# Patient Record
Sex: FEMALE | Race: WHITE | Employment: FULL TIME | URBAN - METROPOLITAN AREA
[De-identification: names, ages, dates, MRNs, and addresses within clinical notes are randomized per-mention and may not be internally consistent; named-entity substitution may affect disease eponyms.]

---

## 2017-05-06 ENCOUNTER — HOSPITAL ENCOUNTER (OUTPATIENT)
Dept: MAMMOGRAPHY | Age: 42
Discharge: HOME | End: 2017-05-06
Attending: OBSTETRICS & GYNECOLOGY
Payer: COMMERCIAL

## 2017-05-06 DIAGNOSIS — Z80.3 FAMILY HISTORY OF BREAST CANCER: ICD-10-CM

## 2017-05-06 DIAGNOSIS — Z12.31 ENCOUNTER FOR SCREENING MAMMOGRAM FOR BREAST CANCER: ICD-10-CM

## 2017-05-06 PROCEDURE — 77063 BREAST TOMOSYNTHESIS BI: CPT | Performed by: OBSTETRICS & GYNECOLOGY

## 2017-05-06 PROCEDURE — 77067 SCR MAMMO BI INCL CAD: CPT | Performed by: OBSTETRICS & GYNECOLOGY

## 2017-12-12 ENCOUNTER — OFFICE VISIT (OUTPATIENT)
Dept: FAMILY MEDICINE CLINIC | Facility: CLINIC | Age: 42
End: 2017-12-12

## 2017-12-12 VITALS
HEART RATE: 72 BPM | SYSTOLIC BLOOD PRESSURE: 90 MMHG | DIASTOLIC BLOOD PRESSURE: 60 MMHG | RESPIRATION RATE: 16 BRPM | WEIGHT: 135 LBS | BODY MASS INDEX: 23.05 KG/M2 | OXYGEN SATURATION: 98 % | TEMPERATURE: 98 F | HEIGHT: 64 IN

## 2017-12-12 DIAGNOSIS — J06.9 VIRAL URI: Primary | ICD-10-CM

## 2017-12-12 PROCEDURE — 99213 OFFICE O/P EST LOW 20 MIN: CPT | Performed by: NURSE PRACTITIONER

## 2017-12-12 NOTE — PROGRESS NOTES
CHIEF COMPLAINT:   Patient presents with:  Cold: sinus headache,congestion,post nasal drip and cough sx x 2-3 days. HPI:   America Muller is a 38year old female who presents for upper respiratory symptoms for  2 days.  Patient reports sore throat only CARDIO: RRR without murmur  GI: active BS's x4,no masses, hepatosplenomegaly, or tenderness on direct palpation  EXTREMITIES: no cyanosis, clubbing or edema  LYMPH:  No lymphadenopathy.         ASSESSMENT AND PLAN:   Mercedes Enciso is a 38year old female who · You may use acetaminophen or ibuprofen to control pain and fever, unless another medicine was prescribed. If you have chronic liver or kidney disease, have ever had a stomach ulcer or gastrointestinal bleeding, or are taking blood-thinning medicines, channing

## 2021-02-11 DIAGNOSIS — Z23 NEED FOR VACCINATION: ICD-10-CM

## 2021-03-15 PROBLEM — G89.29 CHRONIC RIGHT SHOULDER PAIN: Status: ACTIVE | Noted: 2021-03-15

## 2021-03-15 PROBLEM — M25.511 CHRONIC RIGHT SHOULDER PAIN: Status: ACTIVE | Noted: 2021-03-15

## 2021-05-26 ENCOUNTER — HOSPITAL ENCOUNTER (OUTPATIENT)
Dept: MAMMOGRAPHY | Age: 46
Discharge: HOME | End: 2021-05-26
Attending: OBSTETRICS & GYNECOLOGY
Payer: COMMERCIAL

## 2021-05-26 DIAGNOSIS — Z12.31 ENCOUNTER FOR SCREENING MAMMOGRAM FOR MALIGNANT NEOPLASM OF BREAST: ICD-10-CM

## 2021-05-26 PROCEDURE — 77067 SCR MAMMO BI INCL CAD: CPT | Performed by: OBSTETRICS & GYNECOLOGY

## 2021-05-26 PROCEDURE — 77063 BREAST TOMOSYNTHESIS BI: CPT | Performed by: OBSTETRICS & GYNECOLOGY

## 2022-08-09 ENCOUNTER — HOSPITAL ENCOUNTER (OUTPATIENT)
Dept: MAMMOGRAPHY | Age: 47
Discharge: HOME | End: 2022-08-09
Attending: OBSTETRICS & GYNECOLOGY
Payer: COMMERCIAL

## 2022-08-09 DIAGNOSIS — Z12.31 ENCOUNTER FOR SCREENING MAMMOGRAM FOR MALIGNANT NEOPLASM OF BREAST: ICD-10-CM

## 2022-08-09 PROCEDURE — 77063 BREAST TOMOSYNTHESIS BI: CPT | Performed by: OBSTETRICS & GYNECOLOGY

## 2022-08-09 PROCEDURE — 77067 SCR MAMMO BI INCL CAD: CPT | Performed by: OBSTETRICS & GYNECOLOGY

## 2023-05-19 ENCOUNTER — OFFICE VISIT (OUTPATIENT)
Dept: FAMILY MEDICINE CLINIC | Facility: CLINIC | Age: 48
End: 2023-05-19
Payer: COMMERCIAL

## 2023-05-19 VITALS
WEIGHT: 140 LBS | TEMPERATURE: 98 F | HEIGHT: 64 IN | SYSTOLIC BLOOD PRESSURE: 121 MMHG | HEART RATE: 68 BPM | OXYGEN SATURATION: 98 % | RESPIRATION RATE: 16 BRPM | BODY MASS INDEX: 23.9 KG/M2 | DIASTOLIC BLOOD PRESSURE: 69 MMHG

## 2023-05-19 DIAGNOSIS — J01.00 ACUTE NON-RECURRENT MAXILLARY SINUSITIS: Primary | ICD-10-CM

## 2023-05-19 PROCEDURE — 3078F DIAST BP <80 MM HG: CPT | Performed by: FAMILY MEDICINE

## 2023-05-19 PROCEDURE — 99202 OFFICE O/P NEW SF 15 MIN: CPT | Performed by: FAMILY MEDICINE

## 2023-05-19 PROCEDURE — 3008F BODY MASS INDEX DOCD: CPT | Performed by: FAMILY MEDICINE

## 2023-05-19 PROCEDURE — 3074F SYST BP LT 130 MM HG: CPT | Performed by: FAMILY MEDICINE

## 2023-05-19 RX ORDER — AMOXICILLIN AND CLAVULANATE POTASSIUM 875; 125 MG/1; MG/1
1 TABLET, FILM COATED ORAL 2 TIMES DAILY
Qty: 20 TABLET | Refills: 0 | Status: SHIPPED | OUTPATIENT
Start: 2023-05-19 | End: 2023-05-29

## 2023-08-31 ENCOUNTER — TELEPHONE (OUTPATIENT)
Dept: ORTHOPEDICS CLINIC | Facility: CLINIC | Age: 48
End: 2023-08-31

## 2023-08-31 ENCOUNTER — HOSPITAL ENCOUNTER (OUTPATIENT)
Dept: GENERAL RADIOLOGY | Age: 48
Discharge: HOME | End: 2023-08-31
Attending: PHYSICIAN ASSISTANT
Payer: COMMERCIAL

## 2023-08-31 ENCOUNTER — OFFICE VISIT (OUTPATIENT)
Dept: ORTHOPEDICS CLINIC | Facility: CLINIC | Age: 48
End: 2023-08-31
Payer: COMMERCIAL

## 2023-08-31 VITALS — BODY MASS INDEX: 24.75 KG/M2 | WEIGHT: 145 LBS | HEIGHT: 64 IN

## 2023-08-31 DIAGNOSIS — S63.632A SPRAIN OF INTERPHALANGEAL JOINT OF RIGHT MIDDLE FINGER, INITIAL ENCOUNTER: Primary | ICD-10-CM

## 2023-08-31 DIAGNOSIS — M79.644 PAIN OF RIGHT MIDDLE FINGER: ICD-10-CM

## 2023-08-31 DIAGNOSIS — M79.644 PAIN OF RIGHT MIDDLE FINGER: Primary | ICD-10-CM

## 2023-08-31 PROCEDURE — 73140 X-RAY EXAM OF FINGER(S): CPT | Performed by: PHYSICIAN ASSISTANT

## 2023-09-23 LAB
AMB EXT CHOL/HDL RATIO: 2.6
AMB EXT CHOLESTEROL, TOTAL: 174 MG/DL
AMB EXT GLUCOSE: 94 MG/DL
AMB EXT HDL CHOLESTEROL: 67 MG/DL
AMB EXT HEMATOCRIT: 40
AMB EXT HEMOGLOBIN: 13.4
AMB EXT HGBA1C: 5.3 %
AMB EXT LDL CHOLESTEROL, DIRECT: 99 MG/DL
AMB EXT MCV: 94
AMB EXT PLATELETS: 276
AMB EXT TOTAL VITAMIN D: 35.5
AMB EXT TRIGLYCERIDES: 37 MG/DL
AMB EXT TSH: 0.72 MIU/ML
AMB EXT WBC: 7.6 X10(3)UL

## 2023-12-08 ENCOUNTER — OFFICE VISIT (OUTPATIENT)
Dept: FAMILY MEDICINE CLINIC | Facility: CLINIC | Age: 48
End: 2023-12-08
Payer: COMMERCIAL

## 2023-12-08 VITALS
BODY MASS INDEX: 24.75 KG/M2 | RESPIRATION RATE: 16 BRPM | HEART RATE: 60 BPM | SYSTOLIC BLOOD PRESSURE: 119 MMHG | TEMPERATURE: 98 F | DIASTOLIC BLOOD PRESSURE: 66 MMHG | HEIGHT: 64 IN | WEIGHT: 145 LBS | OXYGEN SATURATION: 96 %

## 2023-12-08 DIAGNOSIS — J06.9 VIRAL UPPER RESPIRATORY TRACT INFECTION: Primary | ICD-10-CM

## 2023-12-08 PROCEDURE — 3078F DIAST BP <80 MM HG: CPT | Performed by: FAMILY MEDICINE

## 2023-12-08 PROCEDURE — 3074F SYST BP LT 130 MM HG: CPT | Performed by: FAMILY MEDICINE

## 2023-12-08 PROCEDURE — 99213 OFFICE O/P EST LOW 20 MIN: CPT | Performed by: FAMILY MEDICINE

## 2023-12-08 PROCEDURE — 3008F BODY MASS INDEX DOCD: CPT | Performed by: FAMILY MEDICINE

## 2023-12-08 NOTE — PATIENT INSTRUCTIONS
Use OTC meds for comfort as needed--  Ibuprofen/Tylenol for fever/pain  Use Benadryl at bedtime to reduce drainage and promote rest.  Zyrtec/Claritin/Allegra in the AM to reduce nasal drainage without sedation. Use saline nasal sprays to reduce congestion and thin secretions. Use Delsym for cough. Consider applying coby's vapo-rub or eucayptus oil to chest and feet at bedtime to reduce chest and nasal congestion. Warm tea with honey, cough lozenges, vaporizers/steam etc.    If no better in 2-3 days, follow-up with your PCP for further evaluation.

## 2023-12-21 ENCOUNTER — OFFICE VISIT (OUTPATIENT)
Dept: FAMILY MEDICINE CLINIC | Facility: CLINIC | Age: 48
End: 2023-12-21
Payer: COMMERCIAL

## 2023-12-21 VITALS
WEIGHT: 145 LBS | SYSTOLIC BLOOD PRESSURE: 112 MMHG | HEIGHT: 64 IN | DIASTOLIC BLOOD PRESSURE: 77 MMHG | TEMPERATURE: 98 F | HEART RATE: 66 BPM | OXYGEN SATURATION: 97 % | BODY MASS INDEX: 24.75 KG/M2 | RESPIRATION RATE: 18 BRPM

## 2023-12-21 DIAGNOSIS — J01.40 ACUTE NON-RECURRENT PANSINUSITIS: Primary | ICD-10-CM

## 2023-12-21 PROCEDURE — 3078F DIAST BP <80 MM HG: CPT | Performed by: NURSE PRACTITIONER

## 2023-12-21 PROCEDURE — 3074F SYST BP LT 130 MM HG: CPT | Performed by: NURSE PRACTITIONER

## 2023-12-21 PROCEDURE — 3008F BODY MASS INDEX DOCD: CPT | Performed by: NURSE PRACTITIONER

## 2023-12-21 PROCEDURE — 99213 OFFICE O/P EST LOW 20 MIN: CPT | Performed by: NURSE PRACTITIONER

## 2023-12-21 RX ORDER — AMOXICILLIN AND CLAVULANATE POTASSIUM 875; 125 MG/1; MG/1
1 TABLET, FILM COATED ORAL 2 TIMES DAILY
Qty: 14 TABLET | Refills: 0 | Status: SHIPPED | OUTPATIENT
Start: 2023-12-21 | End: 2023-12-28

## 2024-04-11 ENCOUNTER — OFFICE VISIT (OUTPATIENT)
Dept: FAMILY MEDICINE CLINIC | Facility: CLINIC | Age: 49
End: 2024-04-11
Payer: COMMERCIAL

## 2024-04-11 ENCOUNTER — TELEPHONE (OUTPATIENT)
Dept: FAMILY MEDICINE CLINIC | Facility: CLINIC | Age: 49
End: 2024-04-11

## 2024-04-11 VITALS
BODY MASS INDEX: 25.78 KG/M2 | OXYGEN SATURATION: 98 % | DIASTOLIC BLOOD PRESSURE: 70 MMHG | HEART RATE: 68 BPM | TEMPERATURE: 98 F | HEIGHT: 64 IN | RESPIRATION RATE: 16 BRPM | SYSTOLIC BLOOD PRESSURE: 100 MMHG | WEIGHT: 151 LBS

## 2024-04-11 DIAGNOSIS — R06.00 DYSPNEA, UNSPECIFIED TYPE: ICD-10-CM

## 2024-04-11 DIAGNOSIS — Z00.00 ENCOUNTER FOR MEDICAL EXAMINATION TO ESTABLISH CARE: Primary | ICD-10-CM

## 2024-04-11 DIAGNOSIS — Z12.12 ENCOUNTER FOR COLORECTAL CANCER SCREENING: ICD-10-CM

## 2024-04-11 DIAGNOSIS — Z12.11 ENCOUNTER FOR COLORECTAL CANCER SCREENING: ICD-10-CM

## 2024-04-11 DIAGNOSIS — Z97.5 IUD (INTRAUTERINE DEVICE) IN PLACE: ICD-10-CM

## 2024-04-11 DIAGNOSIS — Z13.31 POSITIVE DEPRESSION SCREENING: ICD-10-CM

## 2024-04-11 DIAGNOSIS — N95.1 PERIMENOPAUSAL: ICD-10-CM

## 2024-04-11 DIAGNOSIS — I34.1 MITRAL VALVE PROLAPSE: ICD-10-CM

## 2024-04-11 DIAGNOSIS — E66.3 OVERWEIGHT (BMI 25.0-29.9): ICD-10-CM

## 2024-04-11 RX ORDER — ALBUTEROL SULFATE 90 UG/1
AEROSOL, METERED RESPIRATORY (INHALATION)
Qty: 3.7 G | Refills: 1 | Status: SHIPPED | OUTPATIENT
Start: 2024-04-11

## 2024-04-11 NOTE — PATIENT INSTRUCTIONS
Weight Loss Tips  Cut down on sugar and starches.  Ok to eat healthy fat ( avocado, nuts,eggs, olive oil and coconut oil)  Eat protein with each meal target 15-30 G of protein with each meal: Protein reduces appetite, cravings and hunger. It increases muscle mass and subsequently metabolism and fat burning.  Limit carbohydrates <100g / day  Limit processed food, eat unprocessed food whenever you can.  Read nutrition labels  Drink >64 oz of water per day: Water is a natural appetite suppressant, increases calorie burning and help you to loose weight.  Do not drink your calories ( avoid soft drinks, juice, high calorie coffee drinks, limit alcohol) Also stay away from artificially sweetened beverages too it can cause weight gain.  Attempt Intermittent Fasting--- Fast for 16 hrs and Feast for 8hrs  Do not eat late at night.   Exercise goal for weight loss is 150 minutes per week. (There is benefit from doing TWO 1hr and 15m workouts meets the 150m goal)  Use smart phone applications to track your progress/intake e.g:(Myfitnesspal )  Reduce your stress level; Bedtime meditation 5-10 minutes daily.   Helpful exercise apps( iFIT)  Helpful fasting apps :( Zero)   Foods to avoid :  Sugar: sweets, ice cream, fruit juices, candy and food that is high in added sugar.  Highly processed food  Refined grains: Rice, wheat, bread, cereal and pasta.  Starchy vegetables: Potatoes and corn      Low Carb food :  Meat: lamb, steak, chicken, turkey  Fish and sea food   Eggs  Plain yogurt   Cheese   Fat and oils   Fruits: berries are the best        How I Plan to Lose Weight  Goal setting is the “how” of weight loss. Motivators are the “why.” When setting goals, utilize the SMART technique:  SMART Technique Example   Specific Who, what, where, when, how… “I want to lose 10 pounds in two months.”   Measureable How will you track? 10 pounds in 8 weeks = 1.25 pounds/week   Attainable Resources you have available, previous experience “I have  been able to do this before, and now I have new tools from my doctor!”   Relevant Why this goal is important Review your motivators above   Timely Set benchmarks and deadlines “Focusing for two month intervals works for me.”      Even if your lose 0.5 pound per week You will still lose 26                       pounds by this time next year!         Strength Training offers multiple health benefits:  Increased muscle mass: Muscle mass naturally decreases with age, but strength training can help reverse the trend.  Stronger bones: Strength training increases bone density and reduces the risk of fractures.  Joint flexibility: Strength training helps joints stay flexible and can reduce the symptoms of arthritis.  Weight control: As you gain muscle, your body begins to burn calories more easily, making it easier to control your weight and helping speed-up your metabolism   Balance: Strengthening exercises can increase flexibility and balance as people age, reducing falls and injuries.  iFIT is an Interactive Fitness Workout [employer] affiliated with [employer].  They have some good guided fitness classes for strength training,  HIIT, yoga, etc.  ([email or web address]).    They do offer a free trial to test out the program.  (I don't have any type of affiliation, but have found some of these work-outs to be helpful) Trainer - Salvador Hicks has some good Basic and Advanced Weight Training Series.       [employer] FITNESS- Intermittent Fasting Blueprint (https://www.trip.me.com)    1. Pick the protocol that works with your lifestyle (explained further below):  16/8 Protocol: 16 hours of fasting, 8 hours of feasting every day.  24-hour fast Protocol: 24 hours of fasting 1x-2x per week, eat normally otherwise.  Give your body time to adjust! You might not really be hungry, you’ve just trained your body to expect food every 2 hours over many years. This could take many days. Treat this like a 1-month experiment. See how your body responds, and  adjust along the way  Drinking water, black coffee, or black/green tea during your fasted period is acceptable, but no liquid calories! Avoid all calories during fasting, and confine all eating to the “feasting”  window. If cream/milk/sugar/diet soda makes life worth living to you, add it to your coffee. 95% compliance is better, especially when building the habit and transitioning to IF, than giving up the things you love and then abandoning the practice before the habit begins.  Exercise during a fast for a turbo boost of fat loss. Fasted morning walks and strength training during the fasted window can help reduce body fat. If you get lightheaded, drink plenty of water. Make sure you are consuming enough calories during your ‘feasting’ window to fuel your lifestyle.   Consume enough calories for your body type and goals. During your feasting window, eat healthy sized meals that leave you full, and track your progress. Gaining weight? Reduce calories by 10%. Losing too much weight? Increase calories by 10%. Test, compare, learn, change, fix, test!  Don’t overthink it. Traveling? Make that a fast day and avoid unhealthy airport food. Have a birthday brunch to attend? Eat breakfast that day and skip dinner instead. This is not all-or-nothing.  Skip a meal if no healthy options are present: boom! You’re intermittent fasting.  Start with 16/8 or 24 hour fasts, but adjust it to your schedule. Some people like 20/4, or 18/6. Or they do 24 hour fasts 2x per week instead of once. Make it work for your life.  Combine this with other strategies for maximum effectiveness. To become an actual superhero, Intermittent Fasting can and should be combined with exercise, a nutritious diet composed of vegetables, protein, and healthy fats, and plenty of sleep. Read Prevoty.com for other tips and tricks to level up your life, every single day     Weight loss is a challenge for may women.    The physicians in our group recommend  the book \"The Obesity Code\" by Ricardo Montiel. It can be easily purchased on Amazon and is also available in an audiobook.    Dr. Montiel writes about how high glucose levels, high insulin levels, and insulin resistance cause weight gain. Instead of focusing on calories and exercise, he recommends focusing on a diet low in sugar and low in processed foods. He also recommends intermittent fasting. These strategies reduce insulin levels and result in sustained weight loss.    Key recommendations include:    1. Focus on diet, not exercise  Exercise is wonderful for you, but it does not usually result in significant weight loss. Although we do recommend regular exercise, we do not recommend it as a primary tool for weight loss. If you feel you can't lose weight because you are limited in how much you can exercise, we recommend you focus instead on what and when you are eating. That is the best way to lose weight.    2. Sleep better and reduce your stress  Elevated cortisol levels (stress hormone) also cause weight gain. Getting a good night's sleep and managing your stress will help you lose weight.    3. Low carb is good - but what is most important is lowering sugar intake  Not all carbs affect your insulin levels the same way. When you look at food labels, it is amazing how much sugar, high fructose corn syrup, and calorie-free sweeteners like stevia are in the foods we eat every day. These sweeteners cause high insulin levels and cause weight gain. Even \"natural\" calorie-free sweeteners like Stevia cause weight gain by increasing your insulin levels. Avoid them. Refined carbohydrates - anything made with white flour - also cause spikes in insulin levels. Avoid them too.    4. Fight insulin resistance  When your body becomes accustomed to high insulin levels, you develop insulin resistance, which causes weight gain. To help your body become more sensitive to insulin again, you need to have long periods of time with low  insulin levels. That's where intermittent fasting comes in. \"Small, frequent meals with lots of healthy snacks\" is TERRIBLE advice for people trying to lose weight. Start eating later in the day (go ahead, skip breakfast) and stop eating earlier (nothing after 6 or 7pm) and that long fast - from 7pm to maybe noon the next day - will help your body become more sensitive to insulin.    5. High-fructose corn syrup is your enemy. Don't consume it. That means no soda. Drinks with sugar as a sweetener are also not your friend. Also, diet drinks that are calorie-free but contain artificial or even \"natural\" sweeteners like Stevia increase your insulin levels. Don't drink sugar or sweetened drinks of any kind. Just don't.    6. Avoid processed foods  Foods with the same amount of carbs - say, bananas vs breakfast cereal - are not metabolized in your body the same way. Processed foods increase your insulin levels much more. Avoid them. It is much better to eat the banana than the breakfast cereal, even if the total calories and total carbs are the same.    7. Try intermittent fasts  A 12-36 hour fast 2-3 times a week will help you lose weight. Try eating breakfast, lunch, and dinner, then don't eat again until dinner the next day. That's a 24 hour fast. You can do it! You can drink water or an unsweetened drink (coffee, tea) during your fast.

## 2024-04-11 NOTE — PROGRESS NOTES
Family Medicine Progress Note  Assessment & Plan:   Graciela Kaminski is a 49 year old female who is here for:     1. Encounter for medical examination to establish care- discussed PMH, PSH, Meds, Soc HX.   Last Annual: ?  Prev Med: Pap completed by OB, is due for Mammo and CRC       2. Dyspnea, unspecified type- Sensation of Dyspnea or not being able to take a deep breath, worse at ngiht, doesn't limit her from activity.  No zuniga or with running.   - Monitor and see if deep breathing assist or breathing colder air when feeling like this.      3. Perimenopausal - cycles have begun to be more abnormal.     4. Overweight (BMI 25.0-29.9)  - Discussed BMI   - Discussion about Intermittent Fasting--- how to implement into life and the pathophys   - Eat >4-5 servings of fruits/vegetables per day.   - Lean protein and Dairy is best (1% or skim)  - Maximize Water intake   - Recommend >150m of vigorous activity.  Can be spaced 5d/wk or weekend warrior-still will get benefit. -- Recc adding some strength training.     5. Mitral valve prolapse- noted on exam     6. Encounter for colorectal cancer screening  49 year old due for CRC screening. Ordered as below   - Occult Blood, Fecal, FIT Immunoassay; Future    7. IUD (intrauterine device) in place  8. Positive depression screening- Discussed with pt, thinks some of her current stressors and body changes from perimeno are contributing, Mild.  Declined services.  [facility] info given, but no ref.           Follow-Up: Return in about 4 weeks (around 5/9/2024) for Annual.      Steph Manzanares DO   04/11/24      CC: Establish Care, Weight Management (Perimenopausal), and Sleep Problem (Out of breath from 12/23. Covid positive end of the December. )    Subjective:    History of Present Illness:  History obtained from patient.     Graciela Kaminski is a 49 year old female who presents for Establish Care, Weight Management (Perimenopausal), and Sleep Problem (Out of breath from 12/23. Covid positive  end of the December. )   IUD no cycles but spotting. ---2018--Mirena.   Pap completed per pt.   Labs 2023- wl   Mammo- Due has order from OB  CRC due     DYSPNEA-Since Dec was having URIs and then end of Dec had COVID.  Since Dec, has been having a hard time taking a deep breath.  Feels more out of breath and that she is having increased HR/Tachycardia.  Does have Known MVP- gets echo q10y.     WEIGHT-Most she has weighed since babies.-- diet is healthy, routine exercise- running multiple times/wk -- hasn't really changed anything in her diet, but feels like she is gaining.    Sleep isnt the best.   Does have some spotting here and there q3wks.  Not consistent.  Curious if she is perimenopausal.      Pmhx: None   Pshx: Mymectomy  All: {NKDA  Fam hx: Breast Ca-ma, CA-mgm    Soc hx: , Teacher, 2 children   Ob/gyne: No LMP recorded (lmp unknown). (Menstrual status: IUD - Intrauterine Device).. last pap: -, last mammogram: 2022,  ,   Colonoscopy: -        RUFINA-7 Scale       Feeling nervous, anxious, or on edge: Several days  Not being able to stop or control worrying: Several days  Worrying too much about different things   : Several days  Trouble relaxing: Several days  Being so restless that it's hard to sit still: Not at all  Becoming easily annoyed or irritable: Over half days  Feeling afraid as if something awful might happen: Not at all    RUFINA 7 Total Score: 6  If you checked off any problems, how difficult have these made it for you to do your work, take care of things at home, or get along with other people?: Somewhat difficult         PHQ9 Scale     1. Little interest or pleasure in doing things: Several days (Seeing Therapist Dr.Margaret Porras)  2. Feeling down, depressed, or hopeless: Several days  3. Trouble falling or staying asleep, or sleeping too much: Several days  4. Feeling tired or having little energy: Several days  5. Poor appetite or overeating: Several days  6. Feeling bad  about yourself - or that you are a failure or have let yourself or your family down: Several days  7. Trouble concentrating on things, such as reading the newspaper or watching television: Not at all  8. Moving or speaking so slowly that other people could have noticed. Or the opposite - being so fidgety or restless that you have been moving around a lot more than usual: Not at all  9. Thoughts that you would be better off dead, or of hurting yourself in some way: Not at all  PHQ-9 TOTAL SCORE: 6               History/Other:   ROS-Per HPI     Problem List:  Patient Active Problem List   Diagnosis    Mitral valve prolapse    Overweight (BMI 25.0-29.9)    Perimenopausal       Current Medications:  Current Outpatient Medications   Medication Sig Dispense Refill    albuterol 108 (90 Base) MCG/ACT Inhalation Aero Soln 2 puffs inhaled 30m prior to exercise 3.7 g 1      Past Medical History:  Past Medical History:    Anxiety      Past Surgical History:  Past Surgical History:   Procedure Laterality Date      3/08, 12/10    Other  myamectomy    Other surgical history        Family History:  Family History   Problem Relation Age of Onset    Breast Cancer Maternal Aunt 50    Obesity Mother         Nia    Stroke Maternal Grandfather     Cancer Maternal Grandmother     Obesity Maternal Grandmother       Social History:  Social History     Socioeconomic History    Marital status:    Tobacco Use    Smoking status: Former     Current packs/day: 0.00     Average packs/day: 0.5 packs/day for 4.0 years (2.0 ttl pk-yrs)     Types: Cigarettes     Start date:      Quit date: 2003     Years since quittin.3    Smokeless tobacco: Never   Vaping Use    Vaping status: Never Used   Substance and Sexual Activity    Alcohol use: Yes     Alcohol/week: 2.0 standard drinks of alcohol     Types: 2 Glasses of wine per week    Drug use: Never   Other Topics Concern    Caffeine Concern Yes     Comment: coffee; 160z  per day    Exercise Yes     Comment: 4x in a week- run/ strengh training    Seat Belt Yes    Special Diet No    Stress Concern No    Weight Concern Yes     Comment: Weight gain past few years       Allergies:  No Known Allergies     Objective:    VITALS: /70   Pulse 68   Temp 97.5 °F (36.4 °C) (Temporal)   Resp 16   Ht 5' 4\" (1.626 m)   Wt 151 lb (68.5 kg)   LMP  (LMP Unknown)   SpO2 98%   BMI 25.92 kg/m²      BP Readings from Last 3 Encounters:   04/11/24 100/70   12/21/23 112/77   12/08/23 119/66     Wt Readings from Last 3 Encounters:   04/11/24 151 lb (68.5 kg)   12/21/23 145 lb (65.8 kg)   12/08/23 145 lb (65.8 kg)         PHYSICAL EXAM  GEN: pleasant, well-appearing in NAD, AOX3  SKIN: no visible rashes, lesions, or evidence of trauma  HEENT: PERRL, EOMI, moist mucous membranes, oropharynx clear, TM clear, nares patent, no Thyromegaly or nodule.   CV: RRR, systolic murmur    PULM: Clear to auscultation, No wheezes, rales, rhonchi.  Non-labored breathing.  NEURO: CNs grossly intact, no focal weakness  MSK: moves all 4 extremities without difficulty  PSYCH: mood and affect are appropriate     Approximately 50 minutes was spent: preparing to see the patient (reviewing prior tests, office notes, and consultant notes), personally obtaining a history, conducting a physical exam, counseling the patient on the plan of care, entering appropriate orders, and documenting clinical information in the electronic health record.              Steph Manzanares DO

## 2024-04-14 PROBLEM — N95.1 PERIMENOPAUSAL: Status: ACTIVE | Noted: 2024-04-14

## 2024-04-14 PROBLEM — I34.1 MITRAL VALVE PROLAPSE: Status: ACTIVE | Noted: 2024-04-14

## 2024-04-14 PROBLEM — G89.29 CHRONIC RIGHT SHOULDER PAIN: Status: RESOLVED | Noted: 2021-03-15 | Resolved: 2024-04-14

## 2024-04-14 PROBLEM — E66.3 OVERWEIGHT (BMI 25.0-29.9): Status: ACTIVE | Noted: 2024-04-14

## 2024-04-14 PROBLEM — Z97.5 IUD (INTRAUTERINE DEVICE) IN PLACE: Status: ACTIVE | Noted: 2024-04-14

## 2024-04-14 PROBLEM — M25.511 CHRONIC RIGHT SHOULDER PAIN: Status: RESOLVED | Noted: 2021-03-15 | Resolved: 2024-04-14

## 2024-09-07 ENCOUNTER — OFFICE VISIT (OUTPATIENT)
Dept: FAMILY MEDICINE CLINIC | Facility: CLINIC | Age: 49
End: 2024-09-07
Payer: COMMERCIAL

## 2024-09-07 ENCOUNTER — TELEPHONE (OUTPATIENT)
Dept: FAMILY MEDICINE CLINIC | Facility: CLINIC | Age: 49
End: 2024-09-07

## 2024-09-07 VITALS
WEIGHT: 148.5 LBS | HEIGHT: 64.57 IN | BODY MASS INDEX: 25.04 KG/M2 | RESPIRATION RATE: 16 BRPM | HEART RATE: 81 BPM | TEMPERATURE: 98 F | OXYGEN SATURATION: 97 % | SYSTOLIC BLOOD PRESSURE: 102 MMHG | DIASTOLIC BLOOD PRESSURE: 66 MMHG

## 2024-09-07 DIAGNOSIS — Z00.00 ROUTINE GENERAL MEDICAL EXAMINATION AT A HEALTH CARE FACILITY: Primary | ICD-10-CM

## 2024-09-07 DIAGNOSIS — Z12.4 ENCOUNTER FOR SCREENING FOR CERVICAL CANCER: ICD-10-CM

## 2024-09-07 DIAGNOSIS — Z97.5 IUD (INTRAUTERINE DEVICE) IN PLACE: ICD-10-CM

## 2024-09-07 DIAGNOSIS — Z87.42 HISTORY OF ABNORMAL CERVICAL PAP SMEAR: ICD-10-CM

## 2024-09-07 DIAGNOSIS — Z12.11 ENCOUNTER FOR COLORECTAL CANCER SCREENING: ICD-10-CM

## 2024-09-07 DIAGNOSIS — I34.1 MITRAL VALVE PROLAPSE: ICD-10-CM

## 2024-09-07 DIAGNOSIS — E66.3 OVERWEIGHT (BMI 25.0-29.9): ICD-10-CM

## 2024-09-07 DIAGNOSIS — N89.8 VAGINAL DISCHARGE: ICD-10-CM

## 2024-09-07 DIAGNOSIS — Z12.31 ENCOUNTER FOR SCREENING MAMMOGRAM FOR MALIGNANT NEOPLASM OF BREAST: ICD-10-CM

## 2024-09-07 DIAGNOSIS — Z12.12 ENCOUNTER FOR COLORECTAL CANCER SCREENING: ICD-10-CM

## 2024-09-07 PROCEDURE — 81514 NFCT DS BV&VAGINITIS DNA ALG: CPT | Performed by: FAMILY MEDICINE

## 2024-09-07 PROCEDURE — 87624 HPV HI-RISK TYP POOLED RSLT: CPT | Performed by: FAMILY MEDICINE

## 2024-09-07 NOTE — PATIENT INSTRUCTIONS
Prevention Guidelines, Women Ages 40 to 49  Screening tests and vaccines are an important part of managing your health. A screening test is done to find diseases in people who don't have any symptoms. The goal is to find a disease early so lifestyle changes and checkups can reduce the risk of disease. Or the goal may be to detect it early to treat it most effectively. Screening tests are not used to diagnose a disease. But they are used to see if more testing is needed. Health counseling is important, too. Below are guidelines for these, for women ages 40 to 49. Talk with your healthcare provider to make sure you’re up-to-date on what you need.  Screening Who needs it How often   Type 2 diabetes or prediabetes All women beginning at age 40 and women without symptoms at any age who are overweight or obese and have 1 or more additional risk factors for diabetes At least every 3 years     Type 2 diabetes or prediabetes All women diagnosed with gestational diabetes Lifelong testing every 3 years   Type 2 diabetes All women with prediabetes Every year   Alcohol misuse All women in this age group At routine exams   Blood pressure All women in this age group Yearly checkup if your blood pressure is normal  Normal blood pressure is less than 120/80 mm Hg  If your blood pressure reading is higher than normal, follow the advice of your healthcare provider   Breast cancer All women at average risk in this age group Screening with a mammogram can start at age 40. Talk with your healthcare provider to help you decide when to start screening. At age 45 start yearly mammograms.     Cervical cancer All women in this age group, except women who have had a complete hysterectomy Pap test every 3 years or Pap test plus human papilloma virus (HPV) test every 5 years   Colorectal cancer Women age 45 years and older at average risk Multiple tests are available and are used at different times. Possible tests include:  Flexible  sigmoidoscopy every 5 years, or  Colonoscopy every 10 years, or  CT colonography (virtual colonoscopy) every 5 years, or  Yearly fecal occult blood test, or  Yearly fecal immunochemical test every year, or  Stool DNA test, every 3 years or  Double contrast barium enema every 5 years  If you choose a test other than a colonoscopy and have an abnormal test result, you will need to follow-up with a colonoscopy. Screening advice varies among expert groups. Talk with your healthcare provider about which tests are best for you.  Some people should be screened using a different schedule because of their personal or family health history. Talk with your healthcare provider about your health history.   Chlamydia Women at increased risk for infection At routine exams if you're at risk or have symptoms   Depression All women in this age group At routine exams   Gonorrhea Sexually active women at increased risk for infection At routine exams   Hepatitis C Anyone at increased risk; 1 time for those born between 1945 and 1965 At routine exams   High cholesterol or triglycerides All women ages 45 and older who are at risk for coronary artery disease; younger women, talk with your healthcare provider At least every 5 years   HIV All women At routine exams. Those with risk factors for HIV should be tested at least annually.   Obesity All women in this age group At routine exams   Syphilis Women at increased risk for infection: talk with your healthcare provider At routine exams   Tuberculosis Women at increased risk for infection Ask your healthcare provider   Vision All women in this age group Complete exam at age 40 and eye exams every 2 to 4 years. If you have a chronic disease, ask your healthcare provider how often you should have your eyes examined.   Vaccine Who needs it How often   Chickenpox (varicella) All women in this age group who have no record of this infection or vaccine 2 doses; the second dose should be given at  least 4 weeks after the first dose   Hepatitis A Women at increased risk for infection: talk with your healthcare provider 2 doses given 6 months apart   Hepatitis B Women at increased risk for infection: talk with your healthcare provider 3 doses over 6 months; second dose should be given 1 month after the first dose; the third dose should be given at least 2 months after the second dose and at least 4 months after the first dose   Haemophilus influenzae Type B (HIB) Women at increased risk 1 to 3 doses   Influenza (flu) All women in this age group Once a year   Measles, mumps, rubella (MMR) All women in this age group who have no record of these infections or vaccines 1 or 2 doses   Meningococcal Women at increased risk for infection: talk with your healthcare provider 1 or more doses   Pneumococcal conjugate vaccine (PCV13) and pneumococcal polysaccharide vaccine (PPSV23) Women at increased risk for infection: talk with your healthcare provider 1 or 2 doses   Tetanus/diphtheria/pertussis (Td/Tdap) booster All women in this age group A 1-time dose of Tdap instead of a Td booster after age 18, then Td every 10 years   Counseling Who needs it How often   BRCA gene mutation testing for breast and ovarian cancer susceptibility Women with increased risk for having gene mutation When your risk is known   Breast cancer and chemoprevention Women at high risk for breast cancer When your risk is known   Diet and exercise Women who are overweight or obese When diagnosed, and then at routine exams   Domestic violence Women at the age in which they are able to have children At routine exams   Sexually transmitted infection prevention Women at increased risk for infection-talk with your healthcare provider At routine exams   Use of tobacco and the health effects it can cause All women in this age group Every exam   Eric last reviewed this educational content on 5/1/2021 © 2000-2022 [employer]. All rights  reserved. This information is not intended as a substitute for professional medical care. Always follow your healthcare professional's instructions.

## 2024-09-07 NOTE — PROGRESS NOTES
Family Medicine Progress Note  Assessment & Plan:   Graciela Kaminski is a 49 year old female who is here for:     1. Routine general medical examination at a health care facility-  Wellness Exam done today and routine Preventative Care discussed as noted below.   -Pap smear:  - today   -Mammogram:  scheduled    -CRC Screen: ordered  -PHQ2- Negative   -Encouraged healthy eating habits and Routine Exercise.   -Bone health: Ca-Vit D supplementation Recc  - labs through workk  -Recommend Annual Well Woman Exams.      2. Encounter for screening for cervical cancer  - ThinPrep PAP Smear; Future  - Hpv Dna  High Risk , Thin Prep Collect; Future  - ThinPrep PAP Smear  - Hpv Dna  High Risk , Thin Prep Collect    3. IUD (intrauterine device) in place-  placed in 2018, good for 8y-- Mirena     4. Mitral valve prolapse-   Echo q10y,     5. Overweight (BMI 25.0-29.9)-  commended on weight loss     6. Encounter for screening mammogram for malignant neoplasm of breast  - [facility] TAMIKO 2D+3D SCREENING BILAT (CPT=77067/61903); Future    7. Encounter for colorectal cancer screening  - COLOGUARD COLON CANCER SCREENING (EXTERNAL)    8. Vaginal discharge-  noted on exam;  check Vaginitis panel, treat accordingly. No Concern for STIs/PID      Follow-Up: Return in about 1 year (around 9/7/2025) for Annual, or sooner should needs arise.      Steph Manzanares, DO   09/07/24      CC: Physical (pap)    Subjective:    History of Present Illness:  History obtained from patient.     Graciela Kaminski is a 49 year old female who presents for Physical (pap)   IUD no cycles but spotting. ---2018--Mirena.    Labs Scanned 4/2024      ANNUAL PHYSICAL  No LMP recorded. (Menstrual status: IUD - Intrauterine Device).   IUD in place Spring 2018   Cervical Cancer Screening- due    PMH of Abnormal Pap: Last year, but not certain what exactly.   Breast Cancer Screening: scheduled 9/14/24  Colon Cancer Screening: - due  fit ORDERED   Exercise: generally tries to be  active-routine exercise.   Healthy eating habits:  Generally Healthy - did do IF for a little bit before school was out, harder in the summer.   Tobacco/Alcohol: Former tobacco use.     Immunizations: TDAP and PCV --- believes TDAP completed in the past 10y         WEIGHT-Most she has weighed since babies.-- diet is healthy, routine exercise- running multiple times/wk -- hasn't really changed anything in her diet, but feels like she is gaining.    Sleep isnt the best.   Does have some spotting here and there q3wks.  Not consistent.  Curious if she is perimenopausal.      DYSPNEA-Since Dec was having URIs and then end of Dec had COVID.  Since Dec, has been having a hard time taking a deep breath.  Feels more out of breath and that she is having increased HR/Tachycardia.  -improved since last visit- no impact on Exercise   MVP- gets echo q10y. ---  Pshx: Mymectomy  All: {NKDA  Fam hx: Breast Ca-ma, CA-mgm    Soc hx: , Teacher, 2 children   Ob/gyne: No LMP recorded. (Menstrual status: IUD - Intrauterine Device).. last pap: -, last mammogram: 2022,  ,   Colonoscopy: -              History/Other:   ROS-Per HPI     Problem List:  Patient Active Problem List   Diagnosis    Mitral valve prolapse    Overweight (BMI 25.0-29.9)    Perimenopausal    IUD (intrauterine device) in place       Current Medications:  Current Outpatient Medications   Medication Sig Dispense Refill    Cholecalciferol (VITAMIN D3) 25 MCG (1000 UT) Oral Cap Take 1 tablet by mouth daily.      Magnesium 100 MG Oral Tab Take by mouth.      albuterol 108 (90 Base) MCG/ACT Inhalation Aero Soln 2 puffs inhaled 30m prior to exercise (Patient not taking: Reported on 2024) 3.7 g 1      Past Medical History:  Past Medical History:    Anxiety      Past Surgical History:  Past Surgical History:   Procedure Laterality Date      3/08, 12/10    Other  myamectomy    Other surgical history        Family History:  Family History   Problem  Relation Age of Onset    Breast Cancer Maternal Aunt 50    Obesity Mother         Nia    Stroke Maternal Grandfather     Cancer Maternal Grandmother     Obesity Maternal Grandmother       Social History:  Social History     Socioeconomic History    Marital status:    Tobacco Use    Smoking status: Former     Current packs/day: 0.00     Average packs/day: 0.5 packs/day for 4.0 years (2.0 ttl pk-yrs)     Types: Cigarettes     Start date:      Quit date: 2003     Years since quittin.7    Smokeless tobacco: Never   Vaping Use    Vaping status: Never Used   Substance and Sexual Activity    Alcohol use: Yes     Alcohol/week: 2.0 standard drinks of alcohol     Types: 2 Glasses of wine per week    Drug use: Never   Other Topics Concern    Caffeine Concern No    Exercise Yes    Seat Belt Yes    Special Diet No    Stress Concern No    Weight Concern Yes     Comment: Weight gain past few years       Allergies:  No Known Allergies     Objective:    VITALS: /66   Pulse 81   Temp 97.9 °F (36.6 °C) (Temporal)   Resp 16   Ht 5' 4.57\" (1.64 m)   Wt 148 lb 8 oz (67.4 kg)   SpO2 97%   BMI 25.04 kg/m²      BP Readings from Last 3 Encounters:   24 102/66   24 100/70   23 112/77     Wt Readings from Last 3 Encounters:   24 148 lb 8 oz (67.4 kg)   24 151 lb (68.5 kg)   23 145 lb (65.8 kg)         PHYSICAL EXAM  GEN: pleasant, well-appearing in NAD, AOX3  SKIN: no visible rashes, lesions, or evidence of trauma  HEENT: PERRL, EOMI, moist mucous membranes, oropharynx clear, TM clear, nares patent, no Thyromegaly or nodule.   CV: RRR, systolic murmur    PULM: Clear to auscultation, No wheezes, rales, rhonchi.  Non-labored breathing.  NEURO: CNs grossly intact, no focal weakness  MSK: moves all 4 extremities without difficulty  PSYCH: mood and affect are appropriate     PELVIC EXAM: Chaperone offered and declined.   Vulva: no masses, tenderness or lesions,   Vagina:  normal mucosa and rugae, no lesions  Cervix: Thick discharge; IUD strings seen;  extropian cervix with nabothian cyst   Uterus: normal size, shape and nontender  Adnexa: normal size, nontender and no masses.            Steph Manzanares DO

## 2024-09-08 LAB
BV BACTERIA DNA VAG QL NAA+PROBE: NEGATIVE
C GLABRATA DNA VAG QL NAA+PROBE: NEGATIVE
C KRUSEI DNA VAG QL NAA+PROBE: NEGATIVE
CANDIDA DNA VAG QL NAA+PROBE: NEGATIVE
T VAGINALIS DNA VAG QL NAA+PROBE: NEGATIVE

## 2024-09-09 LAB — HPV E6+E7 MRNA CVX QL NAA+PROBE: NEGATIVE

## 2024-09-10 LAB
.: NORMAL
.: NORMAL

## 2024-09-14 ENCOUNTER — HOSPITAL ENCOUNTER (OUTPATIENT)
Dept: MAMMOGRAPHY | Age: 49
Discharge: HOME | End: 2024-09-14
Attending: FAMILY MEDICINE
Payer: COMMERCIAL

## 2024-09-14 DIAGNOSIS — Z12.31 ENCOUNTER FOR SCREENING MAMMOGRAM FOR MALIGNANT NEOPLASM OF BREAST: ICD-10-CM

## 2024-09-14 PROCEDURE — 77067 SCR MAMMO BI INCL CAD: CPT | Performed by: FAMILY MEDICINE

## 2024-09-14 PROCEDURE — 77063 BREAST TOMOSYNTHESIS BI: CPT | Performed by: FAMILY MEDICINE

## 2025-02-26 ENCOUNTER — TELEPHONE (OUTPATIENT)
Dept: FAMILY MEDICINE CLINIC | Facility: CLINIC | Age: 50
End: 2025-02-26

## 2025-02-26 NOTE — TELEPHONE ENCOUNTER
Graciela Kaminski  P [facility] Front Office2 hours ago (7:30 AM)     NW  Appointment for: Graciela Kaminski ([id number])  Visit type: MYCHART EXAM (2964)  4/24/2025 3:40 PM (20 minutes) with Steph Manzanares in [facility] [address] [place] [address]     Patient comments:  Continued breathing and heart concerns

## 2025-02-26 NOTE — TELEPHONE ENCOUNTER
Called patient to discuss symptoms. At last OV on 9/7/25   DYSPNEA-Since Dec was having URIs and then end of Dec had COVID.  Since Dec, has been having a hard time taking a deep breath.  Feels more out of breath and that she is having increased HR/Tachycardia.  -improved since last visit- no impact on Exercise   MVP- gets echo q10y. --    Patient states Dr Manzanares prescribed an inhaler but she never got that filled. She continues to have occasional tightness and dyspnea. Is a runner and has not run as much, alternating running and walking. She denies chest pain, severe SOB, numbness, weakness or headaches.     Appointment scheduled sooner to follow up on continued Dyspnea. ER precautions reviewed.

## 2025-03-07 ENCOUNTER — LAB ENCOUNTER (OUTPATIENT)
Dept: LAB | Age: 50
End: 2025-03-07
Attending: FAMILY MEDICINE
Payer: COMMERCIAL

## 2025-03-07 ENCOUNTER — OFFICE VISIT (OUTPATIENT)
Dept: FAMILY MEDICINE CLINIC | Facility: CLINIC | Age: 50
End: 2025-03-07
Payer: COMMERCIAL

## 2025-03-07 VITALS
DIASTOLIC BLOOD PRESSURE: 60 MMHG | TEMPERATURE: 98 F | OXYGEN SATURATION: 99 % | HEART RATE: 88 BPM | HEIGHT: 64 IN | WEIGHT: 153 LBS | BODY MASS INDEX: 26.12 KG/M2 | RESPIRATION RATE: 16 BRPM | SYSTOLIC BLOOD PRESSURE: 112 MMHG

## 2025-03-07 DIAGNOSIS — R06.09 DYSPNEA ON EXERTION: Primary | ICD-10-CM

## 2025-03-07 DIAGNOSIS — Z91.89 AT INCREASED RISK FOR STRESS: ICD-10-CM

## 2025-03-07 DIAGNOSIS — R06.09 DYSPNEA ON EXERTION: ICD-10-CM

## 2025-03-07 DIAGNOSIS — I34.1 MITRAL VALVE PROLAPSE: ICD-10-CM

## 2025-03-07 LAB
BASOPHILS # BLD AUTO: 0.11 X10(3) UL (ref 0–0.2)
BASOPHILS NFR BLD AUTO: 1.5 %
DEPRECATED HBV CORE AB SER IA-ACNC: 19 NG/ML
EOSINOPHIL # BLD AUTO: 0.08 X10(3) UL (ref 0–0.7)
EOSINOPHIL NFR BLD AUTO: 1.1 %
ERYTHROCYTE [DISTWIDTH] IN BLOOD BY AUTOMATED COUNT: 11.9 %
HCT VFR BLD AUTO: 40.6 %
HGB BLD-MCNC: 13.3 G/DL
IMM GRANULOCYTES # BLD AUTO: 0.02 X10(3) UL (ref 0–1)
IMM GRANULOCYTES NFR BLD: 0.3 %
LYMPHOCYTES # BLD AUTO: 1.69 X10(3) UL (ref 1–4)
LYMPHOCYTES NFR BLD AUTO: 23.4 %
MCH RBC QN AUTO: 31.1 PG (ref 26–34)
MCHC RBC AUTO-ENTMCNC: 32.8 G/DL (ref 31–37)
MCV RBC AUTO: 95.1 FL
MONOCYTES # BLD AUTO: 0.4 X10(3) UL (ref 0.1–1)
MONOCYTES NFR BLD AUTO: 5.5 %
NEUTROPHILS # BLD AUTO: 4.91 X10 (3) UL (ref 1.5–7.7)
NEUTROPHILS # BLD AUTO: 4.91 X10(3) UL (ref 1.5–7.7)
NEUTROPHILS NFR BLD AUTO: 68.2 %
PLATELET # BLD AUTO: 270 10(3)UL (ref 150–450)
RBC # BLD AUTO: 4.27 X10(6)UL
TSI SER-ACNC: 0.88 UIU/ML (ref 0.55–4.78)
VIT B12 SERPL-MCNC: 231 PG/ML (ref 211–911)
WBC # BLD AUTO: 7.2 X10(3) UL (ref 4–11)

## 2025-03-07 PROCEDURE — 82607 VITAMIN B-12: CPT | Performed by: FAMILY MEDICINE

## 2025-03-07 PROCEDURE — 84443 ASSAY THYROID STIM HORMONE: CPT | Performed by: FAMILY MEDICINE

## 2025-03-07 PROCEDURE — 82728 ASSAY OF FERRITIN: CPT | Performed by: FAMILY MEDICINE

## 2025-03-07 PROCEDURE — 85025 COMPLETE CBC W/AUTO DIFF WBC: CPT | Performed by: FAMILY MEDICINE

## 2025-03-07 RX ORDER — ALBUTEROL SULFATE 90 UG/1
INHALANT RESPIRATORY (INHALATION)
Qty: 3.7 G | Refills: 1 | Status: SHIPPED | OUTPATIENT
Start: 2025-03-07

## 2025-03-07 NOTE — PROGRESS NOTES
Family Medicine Progress Note  Assessment & Plan:   Follow-Up: Return for 4-6wks.        Assessment & Plan  Dyspnea on exertion  Persistent Dyspnea for the past +6m. Differential includes cardiac, pulmonary, hormonal, or stress-related causes. Comprehensive workup required to exclude other causes before attributing to mental health.  - Order CBC, ferritin, B12, and TSH levels.  - Order Holter monitor for 3-7 days.  - Order stress test.  - EKG in clinic with NSR, was having PVCs   Orders:    EKG with interpretation and Report -IN OFFICE [[id number]]    CARD ZIO EXTENDED MONITOR 3-7 DAYS (CPT=93242/36532); Future; Expected date: 03/07/2025    CARD TREADMILL STRESS, ADULT (CPT=93017); Future; Expected date: 03/07/2025    Vitamin B12; Future; Expected date: 03/07/2025    Ferritin; Future; Expected date: 03/07/2025    CBC With Differential With Platelet; Future; Expected date: 03/07/2025    TSH W Reflex To Free T4; Future; Expected date: 03/07/2025    Mitral valve prolapse  Mitral valve prolapse may contribute to symptoms during increased cardiac demand.; ?pvcs  At increased risk for stress  Increased stress and anxiety related to work, family, and global events. Currently in therapy, considering medication. Comprehensive workup needed to exclude other causes before attributing symptoms to mental health.  ;  Subjective:    CC: Breathing Problem  History of Present Illness:  History obtained from patient.   History of Present Illness  Graciela Kaminski is a 49 year old female who presents for Breathing Problem     DYSPNEA-Since Dec 2023 was having URIs and then end of Dec had COVID.  Since Dec, has been having a hard time taking a deep breath.  Feels more out of breath and that she is having increased HR/Tachycardia.   03/07/25-  During last April we had talked about it.  Over the past 2-3 mos, again is feeling   Feels like she has been more run/walk vs running.   Feels like yawning all the time;  not because of tired.    No  wheezing.    Nothing necessarily worsens it.    Holding back exercise capacity.    She has been experiencing more stress and anxiety recently, related to work, the state of the world, and having teenage children. She is currently seeing a therapist and has been for three years, discussing the potential need for medication management.    WEIGHT-Most she has weighed since babies.-- diet is healthy, routine exercise- running multiple times/wk -- hasn't really changed anything in her diet, but feels like she is gaining.    Sleep isnt the best.   Does have some spotting here and there q3wks.  Not consistent.  Curious if she is perimenopausal.      DYSPNEA-Since Dec was having URIs and then end of Dec had COVID.  Since Dec, has been having a hard time taking a deep breath.  Feels more out of breath and that she is having increased HR/Tachycardia.  -improved since last visit- no impact on Exercise   MVP- gets echo q10y. ---  Mirena IUD no cycles but spotting. ---2018-  Pshx: Mymectomy  All: {NKDA  Fam hx: Breast Ca-ma, CA-mgm    Soc hx: , Teacher, 2 children   Ob/gyne: No LMP recorded. (Menstrual status: IUD - Intrauterine Device).. last pap: 2024, last mammogram: 2024,  ,   Colonoscopy: -              History/Other:   ROS-Per HPI     Problem List:  Patient Active Problem List   Diagnosis    Mitral valve prolapse    Overweight (BMI 25.0-29.9)    Perimenopausal    IUD (intrauterine device) in place    History of abnormal cervical Pap smear    At increased risk for stress    Dyspnea on exertion       Current Medications:  Current Outpatient Medications   Medication Sig Dispense Refill    albuterol 108 (90 Base) MCG/ACT Inhalation Aero Soln 2 puffs inhaled 30m prior to exercise 3.7 g 1    Cholecalciferol (VITAMIN D3) 25 MCG (1000 UT) Oral Cap Take 1 tablet by mouth daily.      Magnesium 100 MG Oral Tab Take by mouth.        Past Medical History:  Past Medical History:    Anxiety      Past Surgical  History:  Past Surgical History:   Procedure Laterality Date      3/08, 12/10    Other  myamectomy    Other surgical history        Family History:  Family History   Problem Relation Age of Onset    Breast Cancer Maternal Aunt 50    Obesity Mother         Nia    Stroke Maternal Grandfather     Cancer Maternal Grandmother     Obesity Maternal Grandmother       Social History:  Social History     Socioeconomic History    Marital status:    Tobacco Use    Smoking status: Former     Current packs/day: 0.00     Average packs/day: 0.5 packs/day for 4.0 years (2.0 ttl pk-yrs)     Types: Cigarettes     Start date:      Quit date: 2003     Years since quittin.2    Smokeless tobacco: Never   Vaping Use    Vaping status: Never Used   Substance and Sexual Activity    Alcohol use: Yes     Alcohol/week: 2.0 standard drinks of alcohol     Types: 2 Glasses of wine per week    Drug use: Never   Other Topics Concern    Caffeine Concern No    Exercise Yes    Seat Belt Yes    Special Diet No    Stress Concern No    Weight Concern Yes     Comment: Weight gain past few years     Social Drivers of Health     Food Insecurity: No Food Insecurity (3/7/2025)    NCSS - Food Insecurity     Worried About Running Out of Food in the Last Year: No     Ran Out of Food in the Last Year: No   Transportation Needs: No Transportation Needs (3/7/2025)    NCSS - Transportation     Lack of Transportation: No   Housing Stability: Not At Risk (3/7/2025)    NCSS - Housing/Utilities     Has Housing: Yes     Worried About Losing Housing: No     Unable to Get Utilities: No       Allergies:  No Known Allergies     Objective:    VITALS: /60   Pulse 88   Temp 97.5 °F (36.4 °C) (Temporal)   Resp 16   Ht 5' 4\" (1.626 m)   Wt 153 lb (69.4 kg)   SpO2 99%   BMI 26.26 kg/m²      BP Readings from Last 3 Encounters:   25 112/60   24 102/66   24 100/70     Wt Readings from Last 3 Encounters:   25 153  lb (69.4 kg)   09/07/24 148 lb 8 oz (67.4 kg)   04/11/24 151 lb (68.5 kg)         PHYSICAL EXAM  GEN: pleasant, well-appearing in NAD, AOX3  SKIN: no visible rashes, lesions, or evidence of trauma  HEENT: PERRL, EOMI, moist mucous membranes, oropharynx clear, .   CV: RRR, + murmur   PULM: Clear to auscultation, No wheezes, rales, rhonchi.  Non-labored breathing.  EXT:  Warm, well perfused, no lower extremity edema  NEURO: CNs grossly intact, no focal weakness  MSK: moves all 4 extremities without difficulty  PSYCH: mood and affect are appropriate       Approximately 55 minutes was spent: preparing to see the patient (reviewing prior tests, office notes, and consultant notes), personally obtaining a history, conducting a physical exam, counseling the patient on the plan of care, entering appropriate orders, and documenting clinical information in the electronic health record.         Steph Manzanares DO

## 2025-03-07 NOTE — PROGRESS NOTES
The following individual(s) verbally consented to be recorded using ambient AI listening technology and understand that they can each withdraw their consent to this listening technology at any point by asking the clinician to turn off or pause the recording:    Patient name: Graciela Kaminski   Additional names:

## 2025-03-10 PROBLEM — R06.09 DYSPNEA ON EXERTION: Status: ACTIVE | Noted: 2025-03-10

## 2025-03-10 PROBLEM — Z91.89: Status: ACTIVE | Noted: 2025-03-10

## 2025-03-10 LAB
ATRIAL RATE: 65 BPM
P AXIS: 67 DEGREES
P-R INTERVAL: 160 MS
Q-T INTERVAL: 426 MS
QRS DURATION: 88 MS
QTC CALCULATION (BEZET): 443 MS
R AXIS: 72 DEGREES
T AXIS: 16 DEGREES
VENTRICULAR RATE: 65 BPM

## 2025-03-11 NOTE — ASSESSMENT & PLAN NOTE
Increased stress and anxiety related to work, family, and global events. Currently in therapy, considering medication. Comprehensive workup needed to exclude other causes before attributing symptoms to mental health.

## 2025-03-11 NOTE — ASSESSMENT & PLAN NOTE
Persistent Dyspnea for the past +6m. Differential includes cardiac, pulmonary, hormonal, or stress-related causes. Comprehensive workup required to exclude other causes before attributing to mental health.  - Order CBC, ferritin, B12, and TSH levels.  - Order Holter monitor for 3-7 days.  - Order stress test.  - EKG in clinic with NSR, was having PVCs   Orders:    EKG with interpretation and Report -IN OFFICE [[id number]]    CARD ZIO EXTENDED MONITOR 3-7 DAYS (CPT=93242/38388); Future; Expected date: 03/07/2025    CARD TREADMILL STRESS, ADULT (CPT=93017); Future; Expected date: 03/07/2025    Vitamin B12; Future; Expected date: 03/07/2025    Ferritin; Future; Expected date: 03/07/2025    CBC With Differential With Platelet; Future; Expected date: 03/07/2025    TSH W Reflex To Free T4; Future; Expected date: 03/07/2025

## 2025-03-31 ENCOUNTER — HOSPITAL ENCOUNTER (OUTPATIENT)
Dept: CV DIAGNOSTICS | Facility: HOSPITAL | Age: 50
Discharge: HOME | End: 2025-03-31
Attending: FAMILY MEDICINE
Payer: COMMERCIAL

## 2025-03-31 DIAGNOSIS — R06.09 DYSPNEA ON EXERTION: ICD-10-CM

## 2025-03-31 PROCEDURE — 93242 EXT ECG>48HR<7D RECORDING: CPT | Performed by: FAMILY MEDICINE

## 2025-03-31 PROCEDURE — 93017 CV STRESS TEST TRACING ONLY: CPT | Performed by: FAMILY MEDICINE

## 2025-03-31 PROCEDURE — 93243 EXT ECG>48HR<7D SCAN A/R: CPT | Performed by: FAMILY MEDICINE

## 2025-03-31 PROCEDURE — 93018 CV STRESS TEST I&R ONLY: CPT | Performed by: FAMILY MEDICINE

## 2025-04-24 ENCOUNTER — OFFICE VISIT (OUTPATIENT)
Dept: FAMILY MEDICINE CLINIC | Facility: CLINIC | Age: 50
End: 2025-04-24
Payer: COMMERCIAL

## 2025-04-24 VITALS
HEART RATE: 68 BPM | TEMPERATURE: 98 F | HEIGHT: 64 IN | BODY MASS INDEX: 26.63 KG/M2 | DIASTOLIC BLOOD PRESSURE: 70 MMHG | RESPIRATION RATE: 16 BRPM | OXYGEN SATURATION: 98 % | SYSTOLIC BLOOD PRESSURE: 120 MMHG | WEIGHT: 156 LBS

## 2025-04-24 DIAGNOSIS — R79.0 LOW FERRITIN: ICD-10-CM

## 2025-04-24 DIAGNOSIS — I47.10 PSVT (PAROXYSMAL SUPRAVENTRICULAR TACHYCARDIA) (HCC): Primary | ICD-10-CM

## 2025-04-24 DIAGNOSIS — E53.8 LOW SERUM VITAMIN B12: ICD-10-CM

## 2025-04-24 DIAGNOSIS — I34.1 MITRAL VALVE PROLAPSE: ICD-10-CM

## 2025-04-24 PROCEDURE — 3008F BODY MASS INDEX DOCD: CPT | Performed by: FAMILY MEDICINE

## 2025-04-24 PROCEDURE — 3078F DIAST BP <80 MM HG: CPT | Performed by: FAMILY MEDICINE

## 2025-04-24 PROCEDURE — 3074F SYST BP LT 130 MM HG: CPT | Performed by: FAMILY MEDICINE

## 2025-04-24 PROCEDURE — 99214 OFFICE O/P EST MOD 30 MIN: CPT | Performed by: FAMILY MEDICINE

## 2025-04-24 RX ORDER — CYANOCOBALAMIN (VITAMIN B-12) 1000 MCG
TABLET ORAL
COMMUNITY
Start: 2025-03-07

## 2025-04-24 RX ORDER — PNV NO.95/FERROUS FUM/FOLIC AC 28MG-0.8MG
TABLET ORAL
COMMUNITY
Start: 2025-03-07

## 2025-04-24 NOTE — ASSESSMENT & PLAN NOTE
Episodes confirmed by Holter monitor with symptoms at rest. She prefers to avoid beta blockers.  - Active Cardiology referral.  - Educated on Valsalva maneuver for episode management.